# Patient Record
Sex: MALE | Race: WHITE | NOT HISPANIC OR LATINO | URBAN - METROPOLITAN AREA
[De-identification: names, ages, dates, MRNs, and addresses within clinical notes are randomized per-mention and may not be internally consistent; named-entity substitution may affect disease eponyms.]

---

## 2020-04-07 ENCOUNTER — OFFICE VISIT - HEALTHEAST (OUTPATIENT)
Dept: FAMILY MEDICINE | Facility: CLINIC | Age: 30
End: 2020-04-07

## 2020-04-07 DIAGNOSIS — R00.2 PALPITATIONS: ICD-10-CM

## 2020-04-07 DIAGNOSIS — F41.9 ANXIETY: ICD-10-CM

## 2020-04-07 LAB
ALBUMIN SERPL-MCNC: 4.6 G/DL (ref 3.5–5)
ALP SERPL-CCNC: 72 U/L (ref 45–120)
ALT SERPL W P-5'-P-CCNC: 91 U/L (ref 0–45)
ANION GAP SERPL CALCULATED.3IONS-SCNC: 13 MMOL/L (ref 5–18)
AST SERPL W P-5'-P-CCNC: 65 U/L (ref 0–40)
BASOPHILS # BLD AUTO: 0.1 THOU/UL (ref 0–0.2)
BASOPHILS NFR BLD AUTO: 1 % (ref 0–2)
BILIRUB SERPL-MCNC: 0.8 MG/DL (ref 0–1)
BUN SERPL-MCNC: 9 MG/DL (ref 8–22)
CALCIUM SERPL-MCNC: 10 MG/DL (ref 8.5–10.5)
CHLORIDE BLD-SCNC: 103 MMOL/L (ref 98–107)
CO2 SERPL-SCNC: 24 MMOL/L (ref 22–31)
CREAT SERPL-MCNC: 1.17 MG/DL (ref 0.7–1.3)
EOSINOPHIL # BLD AUTO: 0.3 THOU/UL (ref 0–0.4)
EOSINOPHIL NFR BLD AUTO: 3 % (ref 0–6)
ERYTHROCYTE [DISTWIDTH] IN BLOOD BY AUTOMATED COUNT: 10.9 % (ref 11–14.5)
GFR SERPL CREATININE-BSD FRML MDRD: >60 ML/MIN/1.73M2
GLUCOSE BLD-MCNC: 93 MG/DL (ref 70–125)
HCT VFR BLD AUTO: 47.3 % (ref 40–54)
HGB BLD-MCNC: 16.5 G/DL (ref 14–18)
LYMPHOCYTES # BLD AUTO: 1.5 THOU/UL (ref 0.8–4.4)
LYMPHOCYTES NFR BLD AUTO: 17 % (ref 20–40)
MCH RBC QN AUTO: 31.6 PG (ref 27–34)
MCHC RBC AUTO-ENTMCNC: 34.8 G/DL (ref 32–36)
MCV RBC AUTO: 91 FL (ref 80–100)
MONOCYTES # BLD AUTO: 0.5 THOU/UL (ref 0–0.9)
MONOCYTES NFR BLD AUTO: 5 % (ref 2–10)
NEUTROPHILS # BLD AUTO: 6.5 THOU/UL (ref 2–7.7)
NEUTROPHILS NFR BLD AUTO: 74 % (ref 50–70)
PLATELET # BLD AUTO: 256 THOU/UL (ref 140–440)
PMV BLD AUTO: 7.8 FL (ref 7–10)
POTASSIUM BLD-SCNC: 4.1 MMOL/L (ref 3.5–5)
PROT SERPL-MCNC: 8.2 G/DL (ref 6–8)
RBC # BLD AUTO: 5.2 MILL/UL (ref 4.4–6.2)
SODIUM SERPL-SCNC: 140 MMOL/L (ref 136–145)
TSH SERPL DL<=0.005 MIU/L-ACNC: 1.2 UIU/ML (ref 0.3–5)
WBC: 8.7 THOU/UL (ref 4–11)

## 2020-04-08 LAB
ATRIAL RATE - MUSE: 92 BPM
DIASTOLIC BLOOD PRESSURE - MUSE: NORMAL
INTERPRETATION ECG - MUSE: NORMAL
P AXIS - MUSE: 58 DEGREES
PR INTERVAL - MUSE: 142 MS
QRS DURATION - MUSE: 114 MS
QT - MUSE: 362 MS
QTC - MUSE: 447 MS
R AXIS - MUSE: -9 DEGREES
SYSTOLIC BLOOD PRESSURE - MUSE: NORMAL
T AXIS - MUSE: 30 DEGREES
VENTRICULAR RATE- MUSE: 92 BPM

## 2021-06-04 VITALS
SYSTOLIC BLOOD PRESSURE: 124 MMHG | WEIGHT: 239 LBS | HEART RATE: 101 BPM | TEMPERATURE: 98.1 F | DIASTOLIC BLOOD PRESSURE: 84 MMHG | OXYGEN SATURATION: 98 % | RESPIRATION RATE: 22 BRPM

## 2021-06-07 NOTE — PATIENT INSTRUCTIONS - HE
I will call tonight or tomorrow with your lab results.   Drink water, avoid extra caffeine, limit nicotine, avoid decongestants.     [facility] Line for anxiety issues:  [phone]    Practice calming breathing when you feel short of breath or have fast heart rate.   Continue usual activities but cut your pace and/or length of walk in half for a few days  Eat well, regular meals.   Watch for evolving symptoms of viral illness, fatigue, body aches, headache, stomach symptoms, cough, sore throat.  OR symptoms of a sinus infection or increased allergies.     For more information about COVID19 and options for caring for yourself at home, please visit the CDC website at https://www.cdc.gov/coronavirus/2019-ncov/about/steps-when-sick.html  For more options for care at [facility], please visit our website at [email or web address]    To ER if persistent or recurrent fast heart rate, chest pain or shortness of breath, faint feeling or anxiety that you cannot settle down.      Recommend schedule physical with primary care.

## 2021-06-07 NOTE — PROGRESS NOTES
Assessment/Plan:   Palpitations/tachycardia  Anxiety  29 year old with family history of CAD and known recent elevated blood pressures presents with racing heart rate episodes and mild chest burning which have triggered anxiety. ECG with NSR but incomplete RBBB which could be normal variant or related to elevated blood pressure etc. Rate came down to 90s. Anxiety high at the onset of visit, he felt well by the end of the visit. Episode prior to arrival occurred after a 40-50 minute vigorous walk and heart rate remained sustained. TSH normal. CMP normal except very mildly elevated LFTs ALT>AST.  In addition he has felt 'crummy' for a few days following excess drinking and hangover on the weekend. Mild allergy sxs for a couple weeks, taking claritin equivalent but no decongestants. Denies sinusitis sxs. Malaise, cold and sweaty feet but no fever or chills, no cough or URI or ST. No V/D. Working from home due to covid-19.  Consider cardiac arrhythmia or ischemia but reassuring how well he felt when anxiety was alleviated. Consider mild viral illness triggering symptoms, or mild alcohol related liver dysfunction, GERD, new onset anxiety disorder or situational anxiety.   Elevated blood pressure on arrival resolved by end of visit.   Covid-19 unlikely but not able to rule out completely. Recommend continued self isolation as he has been mostly doing already.   Recommend ongoing observation, limit nicotine, caffeine, alcohol, vigorous exercise. May continue to exercise but cut the duration and intensity. To ER if persistent or recurrent palpitations, anxiety, chest pain, shortness of breath. Schedule primary care appointment for follow up.   I discussed red flag symptoms, return precautions to clinic/ER and follow up care with patient/parent.  Expected clinical course, symptomatic cares advised. Questions answered. Patient/parent amenable with plan.  - Electrocardiogram Perform and Read  - HM1(CBC and Differential)  -  Comprehensive Metabolic Panel  - Thyroid Cascade  - HM1 (CBC with Diff)    I will call tonight or tomorrow with your lab results.   Drink water, avoid extra caffeine, limit nicotine and alcohol, avoid decongestants.     [facility] Line for anxiety issues:  [phone]    Practice calming breathing when you feel short of breath or have fast heart rate.   Continue usual activities but cut your pace and/or length of walk in half for a few days  Eat well, regular meals.   Watch for evolving symptoms of viral illness, fatigue, body aches, headache, stomach symptoms, cough, sore throat.  OR symptoms of a sinus infection or increased allergies.     For more information about COVID19 and options for caring for yourself at home, please visit the CDC website at https://www.cdc.gov/coronavirus/2019-ncov/about/steps-when-sick.html  For more options for care at [facility], please visit our website at [email or web address]    To ER if persistent or recurrent fast heart rate, chest pain or shortness of breath, faint feeling or anxiety that you cannot settle down.      Recommend schedule physical with primary care.     Subjective:      Anthony Acosta is a 29 y.o. male who presents for evaluation of racing heart rate.  This episode seemed to start at the end of a 40 minute or more walk he had taken this afternoon. He was walking fast though not jogging and felt it was not unexpected to feel a little shortness of breath and fast heart rate though the sensation did not settle down as quickly as usual. He was still feeling a little chest tightness and racing heart rate, and very anxious, when he arrived here in clinic. ECG was done immediately with no definite ischemic changes.   He has not been feeling right since yesterday. He woke yesterday morning with sensation of pounding heart and chest tightness. The heart rate settled after about 15-20 seconds. But he continued to feel a little bit dizzy  and a little bit under the weather, decreased appetite. He noted a buzzing in his ears but has been working from home taking calls and using ear buds and assumed the buzzing has been triggered by that. No definite nausea, no V/D. He noted that his feet were alternately sweaty and cold. No chills otherwise and no fever. He then felt better for most of the day and took a walk after work and felt well. Then in the evening while watching TV in his recliner he started noticing a little shortness of breath and sensation of his chest feeling hot. More buzzing in his ears. Took a claritin equivalent and seemed to feel a bit better. Did not sleep well and this morning did not feel well - the dizziness (more like a mild vertigo than syncope) persisted. There was a little heart pounding when he first got up today but less than yesterday. He called in sick to work due to feeling 'crummy' but could not identify it more than that. After a couple hours felt hungry and much better, went to the grocery store, cooked chicken and vegetables for late lunch which he tolerated well and went for a walk. Felt well on the walk until the end and then had hot sensation over chest and mild shortness of breath and the racing heart beats. No skipping or extra beats noted. No chest pain. No indigestion or GERD or abdominal pain. Fast HR persisted longer than expected after the walk which triggered anxiety.   No cough, no ST, no feverish feeling, no myalgia. No headache. No vision changes, no focal neuro complaints.   Has noted a slight swelling at times in ankles. Has gained weight over the last couple years but today's weight is down 6 pounds or so [facility] (came back negative). No known STI exposures.   No calf tenderness or significant leg edema.   Not generally prone to anxiety.   No history of asthma or needing an inhaler.   Has seasonal allergies in spring for about 5  years. Takes a generic equivalent of claritin for the last 10 days or so for sxs of stuffy nose and itchy eyes. No sinus pain or purulent nasal drainage. Did not take it today - last dose was last evening.   No travel, no ill contacts. Lives with one roommate with space to stay apart.   He reports having 'socialized' with friends on Zoom Friday and Saturday nights, drank a lot more than usual on Saturday night and on  he did feel hung over in the morning which was typical of his hangovers, improved by the midday and felt well the rest of  but stayed up late due to having slept in so long. Then didn't sleep well and woke Monday with the above sxs.   Denies decongestants, no IVDU or illicit drugs, no THC smoking. No nicotine smoking but has been using chew tobacco more in the last 3 weeks while working from home than usual for him.   Has coffee in the morning and a diet Dew in the afternoon - but none today.   Along with the heart racing he felt his thoughts racing as well and felt increasingly anxious - specifically about his heart.   ROS as above otherwise negative.   Family history is significant for AMI in dad age 50s, PGM  of stroke in her 60s, anxiety in mother, sister had a blood clot after an ankle injury, uncle with diabetes.    Due to the covid pandemic and nature of the work he does, now from home, the last 3 weeks have been more stressful.  No prior history of anxiety or mental illness.   He has generally been well otherwise and takes to other routine medications.   [facility] was told his blood pressure was high, 149/100s range. He has been planning to schedule a primary care appointment for a physical.     No Known Allergies    Objective:     /84 (Patient Site: Right Arm, Patient Position: Sitting, Cuff Size: Adult Large)   Pulse (!) 101   Temp 98.1  F (36.7  C) (Oral)   Resp 22   Wt (!) 239 lb (108.4 kg)   SpO2 98%     Physical  General Appearance:  Alert, cooperative, no distress, AVSS, initially anxious and walking in the room, BP slightly high at 147/90, , sats are good, no fever.   Head: Normocephalic, without obvious abnormality, atraumatic  Eyes: Conjunctivae are normal. Extraocular movements are intact. PERRLA, no nystagmus  Ears: Normal TMs and external ear canals, both ears  Nose: No significant congestion.  Throat: Throat is normal.  No exudate.  No significant lesions  Neck: No adenopathy; no vertigo with head movement.   Lungs: Clear to auscultation bilaterally, respirations unlabored  Heart: Regular rate and rhythm, S1 and S2 normal, no murmur, rate around 100  Extremities: No lower extremity edema, no calf pain  Skin: Skin color, texture, turgor normal, no rashes or lesions. A little flushed initially, resolved as he settled down.   Psychiatric: Patient has a normal mood and affect.       ECG was obtained and viewed by me showing NSR, no acute ischemic changes but there is incomplete RBBB.    Recent Results (from the past 24 hour(s))   Electrocardiogram Perform and Read   Result Value Ref Range    SYSTOLIC BLOOD PRESSURE      DIASTOLIC BLOOD PRESSURE      VENTRICULAR RATE 92 BPM    ATRIAL RATE 92 BPM    P-R INTERVAL 142 ms    QRS DURATION 114 ms    Q-T INTERVAL 362 ms    QTC CALCULATION (BEZET) 447 ms    P Axis 58 degrees    R AXIS -9 degrees    T AXIS 30 degrees    MUSE DIAGNOSIS       Normal sinus rhythm  Incomplete right bundle branch block  Borderline ECG  No previous ECGs available  Confirmed by KESHIA LANDON, LES LOC: ([id number]) on 4/8/2020 8:32:06 AM     Comprehensive Metabolic Panel   Result Value Ref Range    Sodium 140 136 - 145 mmol/L    Potassium 4.1 3.5 - 5.0 mmol/L    Chloride 103 98 - 107 mmol/L    CO2 24 22 - 31 mmol/L    Anion Gap, Calculation 13 5 - 18 mmol/L    Glucose 93 70 - 125 mg/dL    BUN 9 8 - 22 mg/dL    Creatinine 1.17 0.70 - 1.30 mg/dL    GFR MDRD Af Amer >60 >60 mL/min/1.73m2    GFR MDRD Non Af Amer >60 >60  mL/min/1.73m2    Bilirubin, Total 0.8 0.0 - 1.0 mg/dL    Calcium 10.0 8.5 - 10.5 mg/dL    Protein, Total 8.2 (H) 6.0 - 8.0 g/dL    Albumin 4.6 3.5 - 5.0 g/dL    Alkaline Phosphatase 72 45 - 120 U/L    AST 65 (H) 0 - 40 U/L    ALT 91 (H) 0 - 45 U/L   Thyroid Cascade   Result Value Ref Range    TSH 1.20 0.30 - 5.00 uIU/mL   HM1 (CBC with Diff)   Result Value Ref Range    WBC 8.7 4.0 - 11.0 thou/uL    RBC 5.20 4.40 - 6.20 mill/uL    Hemoglobin 16.5 14.0 - 18.0 g/dL    Hematocrit 47.3 40.0 - 54.0 %    MCV 91 80 - 100 fL    MCH 31.6 27.0 - 34.0 pg    MCHC 34.8 32.0 - 36.0 g/dL    RDW 10.9 (L) 11.0 - 14.5 %    Platelets 256 140 - 440 thou/uL    MPV 7.8 7.0 - 10.0 fL    Neutrophils % 74 (H) 50 - 70 %    Lymphocytes % 17 (L) 20 - 40 %    Monocytes % 5 2 - 10 %    Eosinophils % 3 0 - 6 %    Basophils % 1 0 - 2 %    Neutrophils Absolute 6.5 2.0 - 7.7 thou/uL    Lymphocytes Absolute 1.5 0.8 - 4.4 thou/uL    Monocytes Absolute 0.5 0.0 - 0.9 thou/uL    Eosinophils Absolute 0.3 0.0 - 0.4 thou/uL    Basophils Absolute 0.1 0.0 - 0.2 thou/uL

## 2021-06-27 ENCOUNTER — HEALTH MAINTENANCE LETTER (OUTPATIENT)
Age: 31
End: 2021-06-27

## 2021-10-17 ENCOUNTER — HEALTH MAINTENANCE LETTER (OUTPATIENT)
Age: 31
End: 2021-10-17

## 2022-07-24 ENCOUNTER — HEALTH MAINTENANCE LETTER (OUTPATIENT)
Age: 32
End: 2022-07-24

## 2022-10-02 ENCOUNTER — HEALTH MAINTENANCE LETTER (OUTPATIENT)
Age: 32
End: 2022-10-02

## 2023-08-12 ENCOUNTER — HEALTH MAINTENANCE LETTER (OUTPATIENT)
Age: 33
End: 2023-08-12